# Patient Record
Sex: MALE | URBAN - METROPOLITAN AREA
[De-identification: names, ages, dates, MRNs, and addresses within clinical notes are randomized per-mention and may not be internally consistent; named-entity substitution may affect disease eponyms.]

---

## 2022-09-30 ENCOUNTER — OFFICE VISIT (OUTPATIENT)
Dept: URBAN - METROPOLITAN AREA CLINIC 91 | Facility: CLINIC | Age: 52
End: 2022-09-30

## 2022-09-30 DIAGNOSIS — H25.813 COMBINED FORMS OF AGE-RELATED CATARACT, BILATERAL: ICD-10-CM

## 2022-09-30 DIAGNOSIS — E11.9 TYPE 2 DIABETES MELLITUS W/O COMPLICATION: Primary | ICD-10-CM

## 2022-09-30 DIAGNOSIS — H35.371 PUCKERING OF MACULA, RIGHT EYE: ICD-10-CM

## 2022-09-30 PROCEDURE — 99204 OFFICE O/P NEW MOD 45 MIN: CPT | Performed by: OPHTHALMOLOGY

## 2022-09-30 ASSESSMENT — INTRAOCULAR PRESSURE
OS: 14
OD: 10

## 2022-09-30 ASSESSMENT — VISUAL ACUITY: OS: 20/30

## 2022-09-30 NOTE — IMPRESSION/PLAN
Impression: Puckering of macula, right eye: H35.371. Plan: I discussed exam and OCT findings related epiretinal membrane in OD with patient, and the need to monitor as ERM may lead to decreased vision and/or distortion over time. Patient advised to monitor vision closely, and to call our office immediately if change is noted.

## 2022-09-30 NOTE — IMPRESSION/PLAN
Impression: Type 2 diabetes mellitus w/o complication: T35.3. Plan: I have explained to patient that there is no evidence of diabetic retinopathy in both eyes. I have stressed the importance of patient maintaining good blood sugar control in order to minimize the risk of the development of diabetic retinopathy, and we discussed the various mechanisms of vision loss associated with diabetic retinopathy. The patient understands the need for yearly retina exams, and the need to maintain medication compliance prescribed by their PCP. Patient will return for regular follow-up appointments with repeat dilation.

## 2022-10-19 ENCOUNTER — PRE-OPERATIVE VISIT (OUTPATIENT)
Dept: URBAN - METROPOLITAN AREA CLINIC 91 | Facility: CLINIC | Age: 52
End: 2022-10-19

## 2022-10-19 DIAGNOSIS — H35.371 PUCKERING OF MACULA, RIGHT EYE: ICD-10-CM

## 2022-10-19 DIAGNOSIS — H25.813 COMBINED FORMS OF AGE-RELATED CATARACT, BILATERAL: Primary | ICD-10-CM

## 2022-10-19 PROCEDURE — 92136 OPHTHALMIC BIOMETRY: CPT | Performed by: SPECIALIST

## 2022-10-19 PROCEDURE — 99212 OFFICE O/P EST SF 10 MIN: CPT | Performed by: SPECIALIST

## 2022-10-19 RX ORDER — KETOROLAC TROMETHAMINE 5 MG/ML
0.5 % SOLUTION OPHTHALMIC
Qty: 4 | Refills: 3 | Status: ACTIVE
Start: 2022-10-19

## 2022-10-19 RX ORDER — PREDNISOLONE ACETATE 10 MG/ML
1 % SUSPENSION/ DROPS OPHTHALMIC
Qty: 5 | Refills: 3 | Status: ACTIVE
Start: 2022-10-19

## 2022-10-19 RX ORDER — OFLOXACIN 3 MG/ML
0.3 % SOLUTION/ DROPS OPHTHALMIC
Qty: 5 | Refills: 3 | Status: ACTIVE
Start: 2022-10-19

## 2022-10-19 ASSESSMENT — VISUAL ACUITY
OD: 20/30
OS: 20/50

## 2022-10-19 NOTE — IMPRESSION/PLAN
Impression: Puckering of macula, right eye: H35.371. Plan: I discussed epiretinal membrane. I will obtain baseline testing at this time. Patient advised to monitor vision closely, and to call our office immediately if change is noted. OCTm order today and reviewed WNL.

## 2022-10-19 NOTE — IMPRESSION/PLAN
Impression: Combined forms of age-related cataract, bilateral: H25.813. Plan: Due to the fact that cataracts are interfering with patient's daily activities in left eye, cataract surgery was discussed as an option to improve patient's vision. I have discussed all risks and benefits associated with surgery. Patient understands that the need for cataract surgery is NOT urgent, and that surgery may be delayed if they wish. I discussed the different intra-ocular lens options available including standard monofocal IOL, PanOptix, Toric, Vivity, Multifocal. I have explained the option of patient proceeding with standard cataract surgery vs LenSx and astigmatism management. I stressed possible side effects such as halos and glare, need for glasses, contacts and further surgery such as laser vision correction or IOL exchange. I answered all patient's questions, and addressed any concerns patient may have. Patient wishes to schedule appointment for pre-operative exam at this time. Patient wishes to proceed with CE IOL OS, good candidate for LenSx.

## 2022-11-03 ENCOUNTER — POST-OPERATIVE VISIT (OUTPATIENT)
Dept: URBAN - METROPOLITAN AREA CLINIC 91 | Facility: CLINIC | Age: 52
End: 2022-11-03

## 2022-11-03 DIAGNOSIS — Z48.810 ENCOUNTER FOR SURGICAL AFTERCARE FOLLOWING SURGERY ON A SENSE ORGAN: Primary | ICD-10-CM

## 2022-11-03 ASSESSMENT — INTRAOCULAR PRESSURE: OS: 19

## 2022-12-09 ENCOUNTER — POST-OPERATIVE VISIT (OUTPATIENT)
Dept: URBAN - METROPOLITAN AREA CLINIC 91 | Facility: CLINIC | Age: 52
End: 2022-12-09

## 2022-12-09 DIAGNOSIS — Z48.810 ENCOUNTER FOR SURGICAL AFTERCARE FOLLOWING SURGERY ON A SENSE ORGAN: Primary | ICD-10-CM

## 2022-12-09 ASSESSMENT — INTRAOCULAR PRESSURE: OS: 14

## 2022-12-09 NOTE — IMPRESSION/PLAN
Impression: S/P CE IOL LenSx OS - 37 Days. Encounter for surgical aftercare following surgery on a sense organ  Z48.810. Excellent post op course   Post operative instructions reviewed - Plan: Patient is healing well status post CE IOL.  Patient will discontinue post operative drops unless directed by physician and follow up as scheduled

## 2024-03-13 ENCOUNTER — OFFICE VISIT (OUTPATIENT)
Dept: URBAN - METROPOLITAN AREA CLINIC 91 | Facility: CLINIC | Age: 54
End: 2024-03-13

## 2024-03-13 DIAGNOSIS — E11.9 TYPE 2 DIABETES MELLITUS W/O COMPLICATION: ICD-10-CM

## 2024-03-13 DIAGNOSIS — H25.811 COMBINED FORMS OF AGE-RELATED CATARACT, RIGHT EYE: Primary | ICD-10-CM

## 2024-03-13 PROCEDURE — 99214 OFFICE O/P EST MOD 30 MIN: CPT | Performed by: OPHTHALMOLOGY

## 2024-03-13 ASSESSMENT — VISUAL ACUITY: OS: 20/25

## 2024-03-13 ASSESSMENT — INTRAOCULAR PRESSURE
OS: 14
OD: 13

## 2024-04-09 ENCOUNTER — Encounter (OUTPATIENT)
Facility: LOCATION | Age: 54
End: 2024-04-09

## 2024-04-09 ENCOUNTER — PROCEDURE (OUTPATIENT)
Facility: LOCATION | Age: 54
End: 2024-04-09

## 2024-04-09 PROCEDURE — 66984 XCAPSL CTRC RMVL W/O ECP: CPT | Performed by: SPECIALIST

## 2024-04-09 PROCEDURE — PR2CC PR2CC: CUSTOM | Performed by: SPECIALIST

## 2024-04-10 ENCOUNTER — POST-OPERATIVE VISIT (OUTPATIENT)
Dept: URBAN - METROPOLITAN AREA CLINIC 91 | Facility: CLINIC | Age: 54
End: 2024-04-10

## 2024-04-10 DIAGNOSIS — Z48.810 ENCOUNTER FOR SURGICAL AFTERCARE FOLLOWING SURGERY ON A SENSE ORGAN: Primary | ICD-10-CM

## 2024-04-10 PROCEDURE — 99024 POSTOP FOLLOW-UP VISIT: CPT | Performed by: SPECIALIST

## 2024-04-10 ASSESSMENT — INTRAOCULAR PRESSURE: OD: 18
